# Patient Record
(demographics unavailable — no encounter records)

---

## 2024-10-18 NOTE — HISTORY OF PRESENT ILLNESS
[FreeTextEntry1] : He is a 53 man who is seen today for nephrolithiasis.  CT scan in May 2024 showed left-sided 7 and 4 mm kidney stones and a right-sided hemorrhagic renal cyst.  In July 2024 he underwent shockwave lithotripsy of the 7 mm stone.  KUB in August 2024 showed that the stone had become more linear about 12 mm.  He has no flank pain.  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  Urinalysis was normal and creatinine was 0.9.  Ultrasound from [facility] showed a left-sided 1 cm kidney stone in April 2024.

## 2024-10-18 NOTE — PHYSICAL EXAM
[General Appearance - Well Developed] : well developed [General Appearance - Well Nourished] : well nourished [Normal Appearance] : normal appearance [Well Groomed] : well groomed [General Appearance - In No Acute Distress] : no acute distress [Edema] : no peripheral edema [] : no respiratory distress [Exaggerated Use Of Accessory Muscles For Inspiration] : no accessory muscle use [Normal Station and Gait] : the gait and station were normal for the patient's age [Oriented To Time, Place, And Person] : oriented to person, place, and time [Affect] : the affect was normal [Mood] : the mood was normal [Not Anxious] : not anxious [de-identified] : Genital exam was done previously

## 2024-10-18 NOTE — ASSESSMENT
[FreeTextEntry1] : Imaging studies were reviewed on the computer screen.  It appears that the 7 mm stone was broken by shockwave lithotripsy but he did not pass significant amount of fragments and the stone became more linear.  He will continue to monitor for now with repeat KUB in about 6 months.  Options such as continued observation versus ureteroscopy and laser lithotripsy were discussed.  Also he will undergo 24-hour urine collection for metabolic workup for kidney stones.  We will discuss the results on the phone.

## 2024-12-10 NOTE — ASSESSMENT
[FreeTextEntry1] : We discussed the results of 24-hour urine collection for metabolic workup for kidney stones in detail.  He only needs to increase urinary volume and citrate.  Other values appear relatively normal.  He will follow-up in a few months with repeat KUB.  Giovanny De Leon MD, FACS [facility]  of Urology [address], [address] [place] [address] Tel: [phone] Fax: [phone]

## 2024-12-10 NOTE — HISTORY OF PRESENT ILLNESS
[FreeTextEntry1] : He is a 53 man who is seen today for nephrolithiasis via telehealth.  He underwent shockwave lithotripsy.  We are discussing his metabolic workup today.  24-hour urine collection showed volume 1.7 L, normal calcium, oxalate and uric acid, pH was 7.2, sodium was 60 and citrate was 199.  Previous notes: CT scan in May 2024 showed left-sided 7 and 4 mm kidney stones and a right-sided hemorrhagic renal cyst.  In July 2024 he underwent shockwave lithotripsy of the 7 mm stone.  KUB in August 2024 showed that the stone had become more linear about 12 mm.  He has no flank pain. 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  Urinalysis was normal and creatinine was 0.9.  Ultrasound from [facility] showed a left-sided 1 cm kidney stone in April 2024.    This telephonic visit was provided via audio only technology. Patient was located at home, at the time of the visit. The physician, Giovanny De Leon MD was located at [place], [place] at the time of the visit. The patient and Provider participated in the telephonic visit. Verbal consent for telephonic services was given on December 10, 2024 by patient. The patient-doctor relationship has been established in a face-to-face fashion via real time video/audio HIPAA compliant communication using telemedicine software. The patient's identity has been confirmed. The patient was previously emailed a copy of the telemedicine consent. They have had a chance to review and has now given verbal consent and has requested care to be assessed and treated via telemedicine. They understand there may be limitations in this process, and that they may need further follow-up care in the office and/or hospital settings.

## 2025-06-23 NOTE — HISTORY OF PRESENT ILLNESS
[FreeTextEntry1] : medication refill [de-identified] : 53 yo male with PMHx HTN HLD presents for a medication refill reports feeling well has regular medical physicals with [facility]

## 2025-06-23 NOTE — HISTORY OF PRESENT ILLNESS
Is This A New Presentation, Or A Follow-Up?: Skin Lesions [FreeTextEntry1] : medication refill How Severe Is Your Skin Lesion?: moderate [de-identified] : 53 yo male with PMHx HTN HLD presents for a medication refill reports feeling well has regular medical physicals with [facility] Have Your Skin Lesions Been Treated?: not been treated Which Family Member (Optional)?: Father